# Patient Record
Sex: FEMALE | Race: BLACK OR AFRICAN AMERICAN | NOT HISPANIC OR LATINO | URBAN - METROPOLITAN AREA
[De-identification: names, ages, dates, MRNs, and addresses within clinical notes are randomized per-mention and may not be internally consistent; named-entity substitution may affect disease eponyms.]

---

## 2021-12-06 ENCOUNTER — OFFICE VISIT (OUTPATIENT)
Dept: URBAN - METROPOLITAN AREA CLINIC 70 | Facility: CLINIC | Age: 66
End: 2021-12-06

## 2022-01-31 ENCOUNTER — WEB ENCOUNTER (OUTPATIENT)
Dept: URBAN - METROPOLITAN AREA CLINIC 70 | Facility: CLINIC | Age: 67
End: 2022-01-31

## 2022-01-31 ENCOUNTER — OFFICE VISIT (OUTPATIENT)
Dept: URBAN - METROPOLITAN AREA CLINIC 70 | Facility: CLINIC | Age: 67
End: 2022-01-31
Payer: COMMERCIAL

## 2022-01-31 ENCOUNTER — LAB OUTSIDE AN ENCOUNTER (OUTPATIENT)
Dept: URBAN - METROPOLITAN AREA CLINIC 70 | Facility: CLINIC | Age: 67
End: 2022-01-31

## 2022-01-31 VITALS
SYSTOLIC BLOOD PRESSURE: 129 MMHG | BODY MASS INDEX: 19.22 KG/M2 | DIASTOLIC BLOOD PRESSURE: 83 MMHG | TEMPERATURE: 97.3 F | WEIGHT: 119.6 LBS | HEART RATE: 73 BPM | HEIGHT: 66 IN

## 2022-01-31 DIAGNOSIS — Z12.11 ENCOUNTER FOR SCREENING COLONOSCOPY: ICD-10-CM

## 2022-01-31 DIAGNOSIS — Z85.028 HISTORY OF GASTRIC CANCER: ICD-10-CM

## 2022-01-31 PROCEDURE — 99204 OFFICE O/P NEW MOD 45 MIN: CPT | Performed by: NURSE PRACTITIONER

## 2022-01-31 RX ORDER — UBIDECARENONE 200 MG
1 TABLET CAPSULE ORAL ONCE A DAY
Status: ACTIVE | COMMUNITY

## 2022-01-31 NOTE — HPI-TODAY'S VISIT:
Patient referred by Dr. Nivia Moy to schedule an EGD and colonoscopy procedures.  A copy of this note will be sent to the referring provider.  Patient has a hx of Stage IIA gastric cancer.  Underwent EGD and colonoscopy in 07/2018 due to findings of severe anemia.  EGD revealed non-erosive esophagitis and a large mass occupying distal 2/3 of stomach.  Colonoscopy revealed left sided diverticulosis but prep was fair.  Later underwent sub-total gastrectomy and cholecystectomy followed by chemotherapy.   Denies signs of GI blood loss, nausea, vomiting, melena, or abdominal pain.  Has started to regain some of weight loss at time of diagnosis.  Labs from 11/24/2021:  Hgb 14.2, MCV 97, , Ferritin 69.7, Serum Iron 62, Iron Sat 21%

## 2022-02-21 ENCOUNTER — OFFICE VISIT (OUTPATIENT)
Dept: URBAN - METROPOLITAN AREA SURGERY CENTER 24 | Facility: SURGERY CENTER | Age: 67
End: 2022-02-21

## 2022-02-21 ENCOUNTER — TELEPHONE ENCOUNTER (OUTPATIENT)
Dept: URBAN - METROPOLITAN AREA CLINIC 40 | Facility: CLINIC | Age: 67
End: 2022-02-21

## 2022-03-01 ENCOUNTER — DASHBOARD ENCOUNTERS (OUTPATIENT)
Age: 67
End: 2022-03-01

## 2022-03-02 ENCOUNTER — OFFICE VISIT (OUTPATIENT)
Dept: URBAN - METROPOLITAN AREA SURGERY CENTER 24 | Facility: SURGERY CENTER | Age: 67
End: 2022-03-02

## 2022-03-04 ENCOUNTER — OFFICE VISIT (OUTPATIENT)
Dept: URBAN - METROPOLITAN AREA CLINIC 70 | Facility: CLINIC | Age: 67
End: 2022-03-04

## 2022-03-04 RX ORDER — UBIDECARENONE 200 MG
1 TABLET CAPSULE ORAL ONCE A DAY
Status: ACTIVE | COMMUNITY

## 2022-03-04 NOTE — HPI-OTHER HISTORIES
--Last office note 01/31/2022: Patient referred by Dr. Nivia Moy to schedule an EGD and colonoscopy procedures.  A copy of this note will be sent to the referring provider.  Patient has a hx of Stage IIA gastric cancer.  Underwent EGD and colonoscopy in 07/2018 due to findings of severe anemia.  EGD revealed non-erosive esophagitis and a large mass occupying distal 2/3 of stomach.  Colonoscopy revealed left sided diverticulosis but prep was fair.  Later underwent sub-total gastrectomy and cholecystectomy followed by chemotherapy.   Denies signs of GI blood loss, nausea, vomiting, melena, or abdominal pain.  Has started to regain some of weight loss at time of diagnosis.  Labs from 11/24/2021:  Hgb 14.2, MCV 97, , Ferritin 69.7, Serum Iron 62, Iron Sat 21%

## 2022-05-16 PROBLEM — 473061005: Status: ACTIVE | Noted: 2022-01-31

## 2022-05-27 ENCOUNTER — OFFICE VISIT (OUTPATIENT)
Dept: URBAN - METROPOLITAN AREA SURGERY CENTER 24 | Facility: SURGERY CENTER | Age: 67
End: 2022-05-27
Payer: COMMERCIAL

## 2022-05-27 DIAGNOSIS — Z12.11 COLON CANCER SCREENING: ICD-10-CM

## 2022-05-27 DIAGNOSIS — Z90.3 ACQUIRED ABSENCE OF STOMACH [PART OF]: ICD-10-CM

## 2022-05-27 PROCEDURE — 43235 EGD DIAGNOSTIC BRUSH WASH: CPT | Performed by: INTERNAL MEDICINE

## 2022-05-27 PROCEDURE — G8907 PT DOC NO EVENTS ON DISCHARG: HCPCS | Performed by: INTERNAL MEDICINE

## 2022-05-27 PROCEDURE — 45378 DIAGNOSTIC COLONOSCOPY: CPT | Performed by: INTERNAL MEDICINE

## 2022-05-27 RX ORDER — UBIDECARENONE 200 MG
1 TABLET CAPSULE ORAL ONCE A DAY
Status: ACTIVE | COMMUNITY

## 2022-06-10 PROBLEM — 2391000175104: Status: ACTIVE | Noted: 2022-06-10

## 2025-06-16 NOTE — PHYSICAL EXAM GASTROINTESTINAL
Abdomen , soft, nontender, nondistended , no guarding or rigidity , no masses palpable , normal bowel sounds , Liver and Spleen: no hepatosplenomegaly
20